# Patient Record
Sex: MALE | Race: BLACK OR AFRICAN AMERICAN | Employment: UNEMPLOYED | URBAN - NONMETROPOLITAN AREA
[De-identification: names, ages, dates, MRNs, and addresses within clinical notes are randomized per-mention and may not be internally consistent; named-entity substitution may affect disease eponyms.]

---

## 2021-07-02 ENCOUNTER — HOSPITAL ENCOUNTER (INPATIENT)
Age: 21
LOS: 7 days | Discharge: HOME | DRG: 885 | End: 2021-07-09
Attending: PSYCHIATRY & NEUROLOGY | Admitting: PSYCHIATRY & NEUROLOGY
Payer: COMMERCIAL

## 2021-07-02 PROBLEM — R45.851 SUICIDAL IDEATIONS: Status: ACTIVE | Noted: 2021-07-02

## 2021-07-02 PROBLEM — F32.9 MDD (MAJOR DEPRESSIVE DISORDER), SINGLE EPISODE: Status: ACTIVE | Noted: 2021-07-02

## 2021-07-02 LAB
CHOLEST SERPL-MCNC: 144 MG/DL
EST. AVERAGE GLUCOSE BLD GHB EST-MCNC: 108 MG/DL
HBA1C MFR BLD: 5.4 % (ref 4–5.6)
HDLC SERPL-MCNC: 59 MG/DL
HDLC SERPL: 2.4 {RATIO} (ref 0–5)
LDLC SERPL CALC-MCNC: 74.4 MG/DL (ref 0–100)
LIPID PROFILE,FLP: NORMAL
TRIGL SERPL-MCNC: 53 MG/DL (ref ?–150)
VLDLC SERPL CALC-MCNC: 10.6 MG/DL

## 2021-07-02 PROCEDURE — 83036 HEMOGLOBIN GLYCOSYLATED A1C: CPT

## 2021-07-02 PROCEDURE — 65220000003 HC RM SEMIPRIVATE PSYCH

## 2021-07-02 PROCEDURE — 36415 COLL VENOUS BLD VENIPUNCTURE: CPT

## 2021-07-02 PROCEDURE — 80061 LIPID PANEL: CPT

## 2021-07-02 RX ORDER — ACETAMINOPHEN 325 MG/1
650 TABLET ORAL
Status: DISCONTINUED | OUTPATIENT
Start: 2021-07-02 | End: 2021-07-09 | Stop reason: HOSPADM

## 2021-07-02 RX ORDER — OLANZAPINE 2.5 MG/1
5 TABLET ORAL
Status: DISCONTINUED | OUTPATIENT
Start: 2021-07-02 | End: 2021-07-09 | Stop reason: HOSPADM

## 2021-07-02 RX ORDER — HYDROXYZINE 25 MG/1
50 TABLET, FILM COATED ORAL
Status: DISCONTINUED | OUTPATIENT
Start: 2021-07-02 | End: 2021-07-09 | Stop reason: HOSPADM

## 2021-07-02 RX ORDER — HALOPERIDOL 5 MG/ML
5 INJECTION INTRAMUSCULAR
Status: DISCONTINUED | OUTPATIENT
Start: 2021-07-02 | End: 2021-07-09 | Stop reason: HOSPADM

## 2021-07-02 RX ORDER — LORAZEPAM 2 MG/ML
1 INJECTION INTRAMUSCULAR
Status: DISCONTINUED | OUTPATIENT
Start: 2021-07-02 | End: 2021-07-09 | Stop reason: HOSPADM

## 2021-07-02 RX ORDER — TRAZODONE HYDROCHLORIDE 50 MG/1
50 TABLET ORAL
Status: DISCONTINUED | OUTPATIENT
Start: 2021-07-02 | End: 2021-07-09 | Stop reason: HOSPADM

## 2021-07-02 RX ORDER — BENZTROPINE MESYLATE 1 MG/1
1 TABLET ORAL
Status: DISCONTINUED | OUTPATIENT
Start: 2021-07-02 | End: 2021-07-09 | Stop reason: HOSPADM

## 2021-07-02 RX ORDER — DIPHENHYDRAMINE HYDROCHLORIDE 50 MG/ML
50 INJECTION, SOLUTION INTRAMUSCULAR; INTRAVENOUS
Status: DISCONTINUED | OUTPATIENT
Start: 2021-07-02 | End: 2021-07-09 | Stop reason: HOSPADM

## 2021-07-02 RX ORDER — ADHESIVE BANDAGE
30 BANDAGE TOPICAL DAILY PRN
Status: DISCONTINUED | OUTPATIENT
Start: 2021-07-02 | End: 2021-07-09 | Stop reason: HOSPADM

## 2021-07-02 NOTE — CONSULTS
Chief Complaints:   Crying       Subjective:     Erin Ling is a 23 y.o. male followed by Unknown, Provider, MD and presents from [facility] after patient sister was concerned about his increased crying depressed mood. Patient not forthcoming with any history on my evaluation and is states that he is fine everything is secondary to stress. Because of patient's initial presentation he was referred for admission to behavioral health unit     No past medical history on file. No past surgical history on file. No family history on file. Social History     Tobacco Use    Smoking status: Current Every Day Smoker     Packs/day: 1.50     Types: Cigarettes   Substance Use Topics    Alcohol use: Not on file       Prior to Admission medications    Not on File     No Known Allergies     Review of Systems:  Review of Systems   Constitutional: Negative for chills, diaphoresis, fatigue and fever. HENT: Negative for congestion, ear pain, postnasal drip, sinus pain and sore throat. Eyes: Negative for pain, discharge and redness. Respiratory: Negative for cough, shortness of breath and wheezing. Cardiovascular: Negative for chest pain and palpitations. Gastrointestinal: Negative for abdominal pain, constipation, diarrhea, nausea and vomiting. Genitourinary: Negative for dysuria, flank pain, frequency and urgency. Musculoskeletal: Negative for arthralgias and myalgias. Neurological: Negative for dizziness, weakness and headaches. Psychiatric/Behavioral: Negative for agitation and hallucinations. The patient is not nervous/anxious. Objective:     Vitals:  Visit Vitals  /76   Pulse 62   Temp 97.8 °F (36.6 °C)   Resp 17   Ht 6' 1\" (1.854 m)   Wt 67.6 kg (149 lb)   SpO2 96%   BMI 19.66 kg/m²       Physical Exam:  General: Alert, cooperative, no distress. Head:  Normocephalic, without obvious abnormality, atraumatic. Eyes:  Conjunctivae/corneas clear.  Pupils equal, round, reactive to light. Extraocular movements intact. Lungs:  Clear to auscultation bilaterally, no wheezes, crackles  Chest wall: No tenderness or deformity. Heart:  Regular rate and rhythm, S1, S2 normal, no murmur, click, rub, or gallop. Abdomen:   Soft, non-tender. Bowel sounds normal. No masses. No organomegaly. Back:  No spine tenderness to palpation  Extremities: Extremities normal, atraumatic, no cyanosis or edema. Pulses: Symmetric all extremities. Skin: Skin color, texture, turgor normal.   Lymph nodes: Cervical nodes normal.  Neurologic: CNII-XII intact. Normal strength, sensation, and reflexes throughout. Labs:  No results found for this or any previous visit (from the past 24 hour(s)). Imaging:  No results found. Assessment & Plan:     Depression:  -Denies any diagnosis of but patient presents with being very tearful and denies any suicidal ideations or attempt  -Management as per primary psychiatric team    Tobacco use  -Smokes at times about a 1-1/2 pack/day and declined nicotine patch at this time    Thank you for allowing us to assist you with care of this patient please call with any questions or concerns    Spent 30 minutes evaluting and coordinating patient care consult of which >50% was spent coordinating and counseling.          Electronically signed by Naomi Hasnen MD on 7/2/2021 at 11:12 AM

## 2021-07-02 NOTE — BH NOTES
ADMISSION NOTE:    Pt is a 22 y/o/ [place] male who came in on a TDO at 36 from [email or web address] after family called concerning his mental health. Pt was reported to have ran into the wood with a box knife in his pocket and called his family telling him he loved them. He was reported to have been found sitting by a tree in the woods crying and later making statements such as, \"I should've went through with it\" and \"I just want to be in the ground next to my father. \"  Pt was reported to have been more depressed lately, lost his step father in February of this year, is having some relationship problems with his girlfriend whom he is expecting a baby with. Pt has NKDA. Pt was positive for THC. Pt reports he smokes 1 1/2 pack cigarettes a day but refused smoking patch. Covid negative. Pt denies taking any medications and stated \"do I have to take medication? \"   Pt advised that it would be his choice and he could discuss that with the doctor but we did discuss the option of an anti-depressant until things in his life settle down. Pt said he lives with his mother and sisters and his step father was the only real father he had and that they were very close and he did everything with him. Pt states that it was sudden and one day he just didn't wake up. Pt did not want to speak about it in detail and that was all he said. He was very tearful through the whole interview. Pt denied that he was currently suicidal or homicidal and said \"I wouldn't hurt anyone else or myself\" and said when they found him he was just sitting by the tree smoking his cigarette. Pt did not want to address statements that were made and didn't want to talk about it. Pt stated he told them he was just tired and wanted to go home. Pt did say he needed to have someone call the [facility] today to inform him where he was as he was to show up there today to serve 9 days in detention and he didn't want them looking for him.   Pt stated he talked to his PO and only said that he was serving time d/t having a hard time making all his appointments due to transportation issues and work. Pt did not want to say why he had charges or why he had a PO. Pt reports no hx of psychiatric care up to this point. He has no medical history. Pt did admit to being depressed, was tearful and anxious about being here but did not rate it. Pt denied SI/HI or AVH. Pt denied pain. Pt was a bit guarded and irritable at times for having to be here. Pt said he was not explained his rights or the process in how he arrived here, so this writer filled him in. Pt still feels he does not need to be here and denies he would not contract for safety at that time even though this was reported as to why he was being placed on TDO. Pt said he wanted to just go back home with his family. Pt skin check completed; no open areas or scars noted. He does have two tattoos to his R&LFA. Pt has both ears pierced and his earrings were locked in bag for security. Pt arrived handcuffed w/officer in wheelchair and security. He only had on hospital scrubs, underwear and socks with no other belongings. Pt changed into brown scrubs, given unit rules, tour and given phone call. Pt has been resting in bed since. Pt is on 15 min safety checks. Will continue to monitor and treat. Pt admitted to room 104-1 under TDO for MDD, single episode and suicidal ideations w/standard  orders, regular diet per Dr. Lisa Dudley.

## 2021-07-02 NOTE — BH NOTES
Patient has remained in bed today with exception of meal times. [facility] would not report today to begin mandatory nine day group home sentence. Denies any SI/HI, anxiety or depression. When asked if prescribed medication would he take it he stated \"No I do not want no medication\".

## 2021-07-03 LAB
CHOLEST SERPL-MCNC: 137 MG/DL
HDLC SERPL-MCNC: 53 MG/DL
HDLC SERPL: 2.6 {RATIO} (ref 0–5)
LDLC SERPL CALC-MCNC: 71.4 MG/DL (ref 0–100)
LIPID PROFILE,FLP: NORMAL
TRIGL SERPL-MCNC: 63 MG/DL (ref ?–150)
VLDLC SERPL CALC-MCNC: 12.6 MG/DL

## 2021-07-03 PROCEDURE — 65220000003 HC RM SEMIPRIVATE PSYCH

## 2021-07-03 PROCEDURE — 36415 COLL VENOUS BLD VENIPUNCTURE: CPT

## 2021-07-03 PROCEDURE — 80061 LIPID PANEL: CPT

## 2021-07-03 RX ORDER — CITALOPRAM 20 MG/1
20 TABLET, FILM COATED ORAL
Status: DISCONTINUED | OUTPATIENT
Start: 2021-07-04 | End: 2021-07-09 | Stop reason: HOSPADM

## 2021-07-03 NOTE — BH NOTES
B: Pt is A&Ox3. He has been in and out of his room tonight, mostly around group time, stayed out and had evening snack. Pt has a flat affect, and denies everything currently and was mainly asking about his court date with the  here and also asking about serving his time here rather than in nursing home. I advised that I had no idea how that worked and he would have to discuss that with the  next week. Pt denies everything tonight including anxiety/depression. Pt denies pain. Pt continues on 15 min safety checks.       I:  Administer medications as ordered and needed. Encourage pt to attend and participate in groups, encourage pt to be up for all meals and snacks, consuming all each time, encourage pt to interact with peers in a positive manner. Q 15 minute safety checks continue.     R:  Pt has no scheduled medication at this time. Pt had no prn's and has been resting well. Pt has been out for meals but said he ate some of it because he didn't like a lot of what he had. Pt said he did fill out menu for tomorrow so he will eat more then. Pt reports he did not attend any groups today but did attend wrap up tonight. Pt had evening snack. Pt was out in [place] but kept more to himself. Pt continues on 15 min safety checks.       P:  Pt will be medication compliant.  Pt will be encouraged to attend all groups and participate.   Pt will have appropriate behaviors with staff and peers.  Pt will have no more suicidal thoughts. Will continue with POC.

## 2021-07-03 NOTE — BH NOTES
Pt. Alert and oriented x 3. Pt. States depression is a 0. Pt. States anxiety is 0. Pt. denies hallucinations. Denies SI/HI. Complies with assessment. I: Administer medications as ordered and needed, Encourage pt to attend and participate in groups, encourage pt to be up for all meals and snacks, consuming all each time, encourage pt to interact with peers in a positive manner. Q 15 minute safety checks continue. R: Complies with medications. does attend groups, does participate. Pt. is getting up for meals, consumes 75% of meals, snacks. Pt. does interact with peers. No safety concerns at this time. P: Patient will continue to participate in group  activity and interact with patients and staff in a positive manner.

## 2021-07-03 NOTE — BH NOTES
Pt. Alert and oriented x 3. Physical Assessment was done et wnl. No c/o pain voiced. Pt. Calm et cooperative. Pt. States depression is a 0/10. Pt. States anxiety is 0/10. Pt. Denies  Hallucinations. Pt. Denies SI/HI. I: Administer medications as ordered and needed, Encourage pt to attend and participate in groups, encourage pt to be up for all meals and snacks, consuming all each time, encourage pt to interact with peers in a positive manner. Q 15 minute safety checks continue. R: Pt. Is compliant with medications. does attend groups, does participate. Pt. is getting up for meals, consumes 100% of meals, snacks. Pt. does interact with peers. no safety concerns at this time. P:Will cont. To monitor q15min. Checks for safety. Administer medications as ordered et as needed. Will cont. To encourage group attendance et participation. Will monitor pt. For any signs of outbursts.

## 2021-07-03 NOTE — BH NOTES
Patient has been out of the room talking with other patients and in the dining room watching tv. No safety concerns at this time.

## 2021-07-03 NOTE — H&P
[facility]  PSYCH HISTORY AND PHYSICAL    Name:  Dina Farr  MR#:  [id number]  :  2000  ACCOUNT #:  [de-identified]  ADMIT DATE:  2021      This is a 25-year-old single  male patient admitted to behavioral health unit under TDO, [facility].    CHIEF COMPLAINT:  He was in the woods, smoking, wanted to cut himself with a . HISTORY OF PRESENT ILLNESS:  Apparently, the family was concerned, called the police that the patient was in the woods with a , crying, threatening to kill himself, want to die, buried next to his stepfather. The patient currently is not getting any psychiatric help. The patient tells me that he works and he makes money and he is supposed to give some money to some ,  or go to prison and serve time. He was trying to get, he could not get any help. Cousins or anybody, he was way behind. He was crying, upset, depressed, he is still vague about it. Apparently, his stepfather . He was close to him, he misses him. Also has issues with his girlfriend. The patient currently ***. He was profusely crying as per family and wants to die. They called [facility] and he was not cooperative and crying uncontrollably. They basically TDO him. PAST HISTORY:  No prior psychiatric treatment. TRAUMA HISTORY:  Denied it. FAMILY HISTORY:  Denied it. SUBSTANCE ABUSE HISTORY:  He smokes one and half-pack of cigarettes. He refused the patch and does smoke marijuana. Alcohol occasionally. MEDICAL HISTORY:  Unremarkable. ALLERGIES TO MEDICATIONS:  NO KNOWN ALLERGIES TO MEDICATION. CURRENT MEDICATIONS:  None. MENTAL STATUS:  Tall, thin-built male patient, alert, verbal, polite, cooperative, flat affect, poor eye contact, distant, not crying, but acknowledged of depression, having the , but he says he is not suicidal anymore.   He still was not sure why he was, except some issues about, may be not making in time to [facility] or pay the fine or missing his stepfather, who he is close to, or girlfriend issue. Denies suicidal thoughts. He felt he do not need to be here. His insight is poor. Judgment is poor. He is minimizing the whole thing. No hallucination, no delusions, no paranoia, no psychosis. He has an approved admission by Dr. Souleymane John. DIAGNOSES:  Major depression, single episode, acute severe with suicidal ideation. The patient needs an inpatient level of care as he is suicidal, but also he is under Temporary snf Order and placed him on an antidepressant medication, some p.r.n. Tylenol, Mylanta, trazodone, and Vistaril. TRAUMA HISTORY:  Denied it. LABORATORY DATA:  I do not see any lab work from [facility]. I did found later. His urinalysis; leukocyte trace, ketones positive, WBC 9, and COVID not detected. Drug screen positive for marijuana and CBC unremarkable. LENGTH OF STAY:  Five to seven days. CRITERIA FOR DISCHARGE:  Free of suicidal thought, learning coping skills, stress management, establish a support system, stable neurovegetative function, mood stability. STRENGTH:  Family support, physically healthy, has a job. LIABILITIES:  Untreated depression.       Safia Acuña MD      [id number]  D:  07/03/2021 12:44  T:  07/03/2021 18:36  JOB #:  [id number]

## 2021-07-03 NOTE — BH NOTES
Pt. Has been visible on the unit today, ambulating in the halls. Pt. Ate meals in the dining room. Pt. Attended et participated in group meetings. Pt. Interacted well with staff et peers. No signs of outbursts noted. Will cont. To monitor q15min. Checks for safety.

## 2021-07-04 PROCEDURE — 65220000003 HC RM SEMIPRIVATE PSYCH

## 2021-07-04 NOTE — H&P
[facility]  PSYCH HISTORY AND PHYSICAL    Name:  Cortez Holt  MR#:  [id number]  :  2000  ACCOUNT #:  [de-identified]  ADMIT DATE:  2021      This is a 28-year-old single  male patient admitted to behavioral health unit under TDO, [facility].    CHIEF COMPLAINT:  He was in the woods, smoking, wanted to cut himself with a . HISTORY OF PRESENT ILLNESS:  Apparently, the family was concerned, called the police that the patient was in the woods with a , crying, threatening to kill himself, want to die, buried next to his stepfather. The patient currently is not getting any psychiatric help. The patient tells me that he works and he makes money and he is supposed to give some money to some ,  or go to half-way and serve time. He was trying to get, he could not get any help. Cousins or anybody, he was way behind. He was crying, upset, depressed, he is still vague about it. Apparently, his stepfather . He was close to him, he misses him. Also has issues with his girlfriend. The patient currentl. He was profusely crying as per family and wants to die. They called [facility] and he was not cooperative and crying uncontrollably. They basically TDO him. PAST HISTORY:  No prior psychiatric treatment. TRAUMA HISTORY:  Denied it. FAMILY HISTORY:  Denied it. SUBSTANCE ABUSE HISTORY:  He smokes one and half-pack of cigarettes. He refused the patch and does smoke marijuana. Alcohol occasionally. MEDICAL HISTORY:  Unremarkable. ALLERGIES TO MEDICATIONS:  NO KNOWN ALLERGIES TO MEDICATION. CURRENT MEDICATIONS:  None. MENTAL STATUS:  Tall, thin-built male patient, alert, verbal, polite, cooperative, flat affect, poor eye contact, distant, not crying, but acknowledged of depression, having the , but he says he is not suicidal anymore.   He still was not sure why he was, except some issues about, may be not making in time to penitentiary or pay the fine or missing his stepfather, who he is close to, or girlfriend issue. Denies suicidal thoughts. He felt he do not need to be here. His insight is poor. Judgment is poor. He is minimizing the whole thing. No hallucination, no delusions, no paranoia, no psychosis. He has an approved admission by Dr. Cristina Muñiz. DIAGNOSES:  Major depression, single episode, acute severe with suicidal ideation. The patient needs an inpatient level of care as he is suicidal, but also he is under Temporary jail Order and placed him on an antidepressant medication, some p.r.n. Tylenol, Mylanta, trazodone, and Vistaril. TRAUMA HISTORY:  Denied it. LABORATORY DATA:  I do not see any lab work from [address] [place] [address]. I did found later. His urinalysis; leukocyte trace, ketones positive, WBC 9, and COVID not detected. Drug screen positive for marijuana and CBC unremarkable. LENGTH OF STAY:  Five to seven days. CRITERIA FOR DISCHARGE:  Free of suicidal thought, learning coping skills, stress management, establish a support system, stable neurovegetative function, mood stability. STRENGTH:  Family support, physically healthy, has a job. LIABILITIES:  Untreated depression.       Jae Hannah MD      [id number]  D:  07/03/2021 12:44  T:  07/03/2021 18:36  JOB #:  [id number]

## 2021-07-04 NOTE — BH NOTES
Pt. Alert and oriented x 4. Pt. States depression is a 0. Pt. States anxiety is 0. Pt. Denies hallucinations. Denies SI/HI. Cooperative with assessment. Pt. States he just said something out of anger and went to cope in the woods like he always does, happen to have a  on him from work that day and everything kind of snowballed from there. He states he was not suicidal, pt. Feels he is not even depressed. Writer spoke with patient in great lengths regarding depression, pt. . states he would talk to the doctor regarding his medication. I: Administer medications as ordered and needed, Encourage pt to attend and participate in groups, encourage pt to be up for all meals and snacks, consuming all each time, encourage pt to interact with peers in a positive manner. Q 15 minute safety checks continue. R: Non compiant with medications, until speaking with physician. does attend groups, does not participate. Pt. is getting up for meals, consumes all of meals, snacks. Pt. does interact with peers. no safety concerns at this time. P:  Pt. Will develop and utilize positive coping skills.

## 2021-07-04 NOTE — BH NOTES
Pt received begining of of shift at phone,    Pt attended wrap up group and ate snack, socializing with others and watching movie play cards and talking with them, no behavioral issues,        denies SI/HI ioana A/V hallucinations, cooperative with assessment . His Father as reported by ED staffa nd supervisor called multiple time trying to locate pt, father phone given to pt at 2130  And pt given  the option to call him to assure him, but pt refused asking to keep his number in nursing station,  Again at 12 father called unit, placed on hold, pt encouraged to talk to him to assure him and to in form him about phone times, pt was hesitant to talk but accepted and talked to his father for a minute. no in formation relased to father at all. about p[t.       No secheduled medication HS time prescribed.        Pt socialized  with other till almost 0015 then   Start sleeping by 0045 , remained sleeping as of this time.     and safety checks Q 15 minutes maintained

## 2021-07-04 NOTE — BH NOTES
Patient has been calm and cooperative. Interacting with patients and staff walking up and down the  and smiling.

## 2021-07-04 NOTE — PROGRESS NOTES
Problem: Suicide  Goal: *STG: Remains safe in hospital  Outcome: Progressing Towards Goal  Goal: Interventions  Outcome: Progressing Towards Goal

## 2021-07-05 PROCEDURE — 65220000003 HC RM SEMIPRIVATE PSYCH

## 2021-07-05 NOTE — BH NOTES
Patient seen for follow-up chart review spoke with the nursing staff patient is out of his room interacting with the peers playing cards attending the groups affect are flat but says he is trying to get better is his primary reason he was upset want to harm himself because he had to pay certain funds and be at a certain place was not able to do the heart regular try it was hard. He was afraid it may affect him negatively from staff.   He rated his level officer note that he is in the hospital says not suicidal he does feel inadequate he says he do not have a car he wanted to work on getting a license to drive

## 2021-07-05 NOTE — BH NOTES
Pt received begining of of shift at phone,     Pt attended wrap up group  Didn't fell group paper. and ate snack, socializing with others and watching movie play cards and talking with them, no behavioral issues,          denies SI/HI ioana A/V hallucinations, cooperative with assessment .     No secheduled medication HS time prescribed.         Pt socialized  with other by watching Tv, playing cards and talking.     Start sleeping [id number] , remained sleeping as of this time.     and safety checks Q 15 minutes maintained

## 2021-07-05 NOTE — BH NOTES
B: Pt is alert and oriented x4. Pt is cooperative with assessments. Pt reports feeling \"good\". Pt states they are here because \" in my back pocket\". With encouragement, Pt continued that the  observed a  in his back pocket. States he was agitated about missing the payment time to reduce his custodial-time, and things got out of hand. Pt identifies they're doing same since their admission. Pt is able to identify personal coping alternatives. No s/s of distress noted. No complaints of pain. I: Assess Pt mood. Document presence of depressive/anxiety symptoms. Assess for Audio/visual hallucinations. Establish trust.  Educate Pt on medications and disease processes. Monitor ADLs, food/fluid consumption and sleep. Encouarge group participation and socialization. Educate Pt on medications, coping alternatives, disease processes, and community resources. Safety checks. R: Pt mood is stable: calm, social. Pt rates depression 0/10, identifies triggers as none. Pt rates anxiety at 0/10, identifies triggers as none. Pt denies SI. Pt denies HI. Pt denies audio/visual hallucinations, s/s are not observed by staff. Pt denies mental health concerns. Pt is attending groups and is interacting appropriately with staff/peers. Pt is eating all meals, and is maintaining their personal hygiene. Pt reports sleeping well. Pt is compliant wiith medications. P:  Encourage group participation and socialization. Assist in coping alternatives to anger.

## 2021-07-05 NOTE — PROGRESS NOTES
Problem: Suicide  Goal: *STG: Remains safe in hospital  Outcome: Progressing Towards Goal  Goal: *STG: Attends activities and groups  Outcome: Progressing Towards Goal  Goal: *STG/LTG: Complies with medication therapy  Outcome: Not Progressing Towards Goal  Goal: Interventions  Outcome: Progressing Towards Goal

## 2021-07-06 PROCEDURE — 65220000003 HC RM SEMIPRIVATE PSYCH

## 2021-07-06 PROCEDURE — 74011250637 HC RX REV CODE- 250/637: Performed by: PSYCHIATRY & NEUROLOGY

## 2021-07-06 RX ADMIN — CITALOPRAM HYDROBROMIDE 20 MG: 20 TABLET ORAL at 12:27

## 2021-07-06 NOTE — BH NOTES
B: Pt is awake and alert, pleasant and cooperative with staff and peers. Denies suicidal and homicidal thoughts. Says depression and anxiety is better. Eating and sleeping well, compliant with meds. Safe on unit. I: Maintain a safe environment for pt, safety cks q 15 mins and prn. Encourage groups and give meds as ordered. R: Pt is pleasant and cooperative with staff and peers. States he feels much better. Attending and participating in groups. Safe on unit. P: Continue to monitor, encourage groups, give meds as ordered.

## 2021-07-06 NOTE — BH NOTES
Pt has been out and about on unit today. Attended and participated in groups. Pleasant and cooperative with staff and peers. Denies suicidal and homicidal thought. Safe on unit will continue to monitor.

## 2021-07-06 NOTE — BH NOTES
Patient seen for follow-up patient is out of his room in the day room with peers his activities lunch and good energy good motivation affect flat but interacting with the peers says feeling better not suicidal did not take any Celexa which further discuss no want to take any medication want to do without it patient was not suicidal not homicidal not impulsive no auditory visual hallucination noted keeping in touch with the family and courses like to have a hearing tomorrow her vital signs today temperature 97.8 pulse 61 blood pressure 93/58 respiration 18 SPO2 98 room air labs reviewed no new labs or his new labs resulted continued inpatient level of care indicated she is going to have a hearing tomorrow

## 2021-07-06 NOTE — BH NOTES
B: Pt is A&Ox4. Pt has been doing well. He has been out on the unit, socializing with peers and staff and is appropriate. Pt states he feels much better is is mostly a little anxious about court tomorrow, unsure of the process and if he will get to go home. He said he had a nice conversation with Nirav. He did say he was asking once again about possibly serving his time here rather than [facility]. Pt has maintained all along that he had no intention of harming himself and at no time did he take the  from his pocket. He does agree he was upset that night but was not intending to kill himself. Pt denies SI/HI and AVH. He denies depression but does have some anxiety over court but does not rate. Pt denies pain. Pt continues on 15 min safety checks.       I:  Administer medications as ordered and needed. Encourage pt to attend and participate in groups, encourage pt to be up for all meals and snacks, consuming all each time, encourage pt to interact with peers in a positive manner. Q 15 minute safety checks continue.     R:  Pt was started on Celexa on the 4th but has since refused it. Pt feels he does not need any medication at this time. Pt had no prn's and says he has been resting well. Pt has been out for all meals and has a good appetite. Pt has been out for groups and has been participating. Pt has had no behaviors and his mood is stable, he remains calm and cooperative. Pt continues on 15 min safety checks.       P:  Pt will be medication compliant.  Pt will be encouraged to attend all groups and participate.   Pt will have appropriate behaviors with staff and peers.  Pt will have no more suicidal thoughts. Will continue with POC.

## 2021-07-06 NOTE — PROGRESS NOTES
Problem: Depressed Mood (Adult/Pediatric)  Goal: *STG: Remains safe in hospital  Outcome: Progressing Towards Goal  Goal: *LTG: Returns to previous level of functioning and participates with after care plan  Outcome: Progressing Towards Goal  Goal: *LTG: Understands illness and can identify signs of relapse  Outcome: Progressing Towards Goal

## 2021-07-06 NOTE — PROGRESS NOTES
Pt is progressing toward his goals. Denies suicidal and homicidal thoughts. Compliant with meds, attending and participating in groups. Safe on unit.

## 2021-07-07 PROCEDURE — 74011250637 HC RX REV CODE- 250/637: Performed by: PSYCHIATRY & NEUROLOGY

## 2021-07-07 PROCEDURE — 65220000003 HC RM SEMIPRIVATE PSYCH

## 2021-07-07 RX ADMIN — CITALOPRAM HYDROBROMIDE 20 MG: 20 TABLET ORAL at 08:48

## 2021-07-07 NOTE — BH NOTES
PSYCHOSOCIAL ASSESSMENT  :Patient identifying info:   Cameron Garrett is a 23 y.o., male admitted 7/2/2021 12:47 AM     Presenting problem and precipitating factors: Pt is TDO allowed to be voluntary. Pt reports he has been depressed due to his situation recently. Pt reports he has legal problems and is facing a short stay in residential due to missing meetings with his . Pt reports he wanted to be by himself and was sitting by the woods. Pt reports his family believed he was suicidal because he had a boxcutter from work. Mental status assessment: Pt is AO x 4, denies AVH, denies SI/HI, cooperative. Pt denies anxiety and depression. Pt has a broad affect and pleasant presentation. Strengths:  Support system, verbal, cooperative    Collateral information: [facility] [phone]: Prescreen notes pt brought in on paperless [employer] after family  Called concerned with mental status. Reports client ran into [employer] with a box knife and called his family to tell them that he loved them. Pt was sobbing when WILFRIDO arrived and had a . Pt was cooperative with WILFRIDO, but during assessment was not cooperative due to unstable mood and sobbing. Pt refused to talk with anyone for assessment and stated, \"I should've gone through  With it. \"  And \"I am tired of life. \"  \"I just want to be in the ground next to my father. \"      Current psychiatric /substance abuse providers and contact info: None    Previous psychiatric/substance abuse providers and response to treatment:   None    Family history of mental illness or substance abuse: None    Substance abuse history:  Marijuana use 2 days per week, 1-2 blunts     Social History     Tobacco Use    Smoking status: Current Every Day Smoker     Packs/day: 1.50     Types: Cigarettes   Substance Use Topics    Alcohol use: Not on file       History of biomedical complications associated with substance abuse :  None    Patient's current acceptance of treatment or motivation for change:  Accepting, cooperative    Family constellation: Raised by his mother and stepfather (since age 10). Stepfather  last year. Pt has 1 sister. He reports his father is not in his life. Is significant other involved? None      Describe support system: Mom, sister    Describe living arrangements and home environment:  He lives in a trailer with his mother.      Health issues:   Hospital Problems  Never Reviewed        Codes Class Noted POA    Suicidal ideations ICD-10-CM: R45.851  ICD-9-CM: V62.84  2021 Unknown        MDD (major depressive disorder), single episode ICD-10-CM: F32.9  ICD-9-CM: 296.20  2021 Unknown              Trauma history: None     Legal issues: Breaking and entry - currently on probation    History of  service: None    Financial status: Employed    Anglican/cultural factors:  None     Education/work history: High School    Have you been licensed as a health care professional (current or ):   None  Leisure and recreation preferences:   Music, game on his phone  Describe coping skills: [address] [facility] SHAN Cobb, Supervisee in Social Work  2021    PSA PART II ADDITIONAL INFORMATION        Access To [employer]: No    Substance Use: YES    Last Use: 2021    Type of Substance: THC use    Frequency of Use: 2 days a week    Request to See : NO    If yes, notified : NO    Release of Information Signed: YES    Release of Information Signed For: Mother

## 2021-07-07 NOTE — PROGRESS NOTES
Pt is progressing toward goals. Deines suicidal and homicidal thoughts. States depression and anxiety is better. Complaint with med, attending groups, safe on unit.

## 2021-07-07 NOTE — BH NOTES
B: Pt is A&Ox4.  Pt appears to be doing well on the unit and is up talking to peers, watching movies and socializing up until bedtime. Pt was reported to also have had a good day. He apparently had a good conversation with his  and also his girlfriend and was reported to have been calm and appropriated during conversations. Pt has been visible on unit all day, had court this morning and went voluntary. Pt was reported to also have attended and participated in groups. Pt still refusing that he needed medication and refused his AM dose but I see he took it in the afternoon though. Pt continues on 15 min safety checks.       I:  Administer medications as ordered and needed. Encourage pt to attend and participate in groups, encourage pt to be up for all meals and snacks, consuming all each time, encourage pt to interact with peers in a positive manner. Q 15 minute safety checks continue.     R:  Pt was started on Celexa on the 4th and refused it this morning again but it was noted that he had taken it at 1227 per [facility]. Pt had no prn's tonight. Pt has been calm and cooperative and had no behaviors today. He has been up for meals today and has good intake reporting he eats everything. Pt did also have several snacks tonight. Pt was up with peers and watching movie tonight in dayroom. Pt did also attend and participate in wrap up group as well. He denies anxiety/depression. He also denies SI/HI and AVH at this time. Pt denies pain. Pt states he is resting well at night so far. Pt still has 9 days [facility] time to serve once he leaves here. He is still hoping to serve his time here.  Pt continues on 15 min safety checks.       P:  Pt will be medication compliant.  Pt will be encouraged to attend all groups and participate.   Pt will have appropriate behaviors with staff and peers.  Pt will have no more suicidal thoughts.  Will continue with POC.

## 2021-07-07 NOTE — BH NOTES
B: pt is awake and alert, deies suicidal and homicidal thoughts. States his depression ad anxiety is better. Complain with meds. Attending and participating in groups. . Pleasant and cooperative with staff and peers. Safe on unit. I: Maintain a safe environment for pt, safety cks q 15 mins and prn. Encourage groups and give meds as ordered. R: Pt is friendly and cooperative with staff. Denies suicidal and homicidal thoughts. Safe on unit will continue to monitor. P: Continue to monitor, encourage groups, give meds as ordered.

## 2021-07-07 NOTE — BH NOTES
Pt has had a good day, pleasant with staff and peers. Out and about on unit, participates in groups. Compliant with meds, sleeps and eats well. Safe on unit will continue to monitor.

## 2021-07-07 NOTE — PROGRESS NOTES
Problem: Depressed Mood (Adult/Pediatric)  Goal: *STG: Verbalizes anger, guilt, and other feelings in a constructive manor  Outcome: Progressing Towards Goal  Goal: *STG: Remains safe in hospital  Outcome: Progressing Towards Goal  Goal: *STG: Complies with medication therapy  Outcome: Progressing Towards Goal  Goal: *LTG: Understands illness and can identify signs of relapse  Outcome: Progressing Towards Goal

## 2021-07-07 NOTE — BH NOTES
Behavioral Health Treatment Team Note     Patient goal(s) for today: Participate in program  Treatment team focus/goals: Assess, engage in treatment, monitor medications    Progress note: Pt is a TDO now voluntarily admitted. Pt will be further assessed to determine psychosocial needs. Pt currently denies SI/HI, denies AVH, and is cooperative. Will further assess, engage in treatment, and monitor medications.      LOS:  5  Expected LOS: 7    Insurance info/prescription coverage:  TDO  Date of last family contact:  None  Family requesting physician contact today:  no  Discharge plan:  Discharge home with follow-up in [facility]  Guns in the home:  no   Outpatient provider(s):  Unknown

## 2021-07-07 NOTE — BH NOTES
Treatment Team Meeting held. Patient in hallway playing basketball. Very pleasant and cooperative. Smiling and states he feels he is ready to be discharged. Patient states that he has 9 days he has to pull in prison but hopes that his  can help him get his time decreased. Patient has been taking his Prozac po every day and states he is feeling some better. Patient will be discharged Thursday or Friday. Gets along well with peers and staff. States he has a good appetite  and is sleeping better at night. Patient denies hallucinations. He also denies any SI/HI ideations. Will continue to follow current plan of care.

## 2021-07-07 NOTE — PROGRESS NOTES
Discussed case interviewed patient  He is doing a little bit better and and is Prozac was started  He is downplaying his suicidal ideations and having had a   Getting along better with peers and staff  Some depressive symptoms  Eating and sleeping better  Participates in groups  Polite has a positive attitude towards others    Mental status exam  Alert oriented cooperative  Speech is goal-directed somewhat anxious  Thought process linear and logical  Insight and judgment fair  Does not appear to respond to internal stimuli    Recommendations  Continue inpatient treatments and assessments  Prozac was initiated  Follow-up with me in the team again tomorrow

## 2021-07-07 NOTE — PROGRESS NOTES
Mr. Cam Coles actively participated in Spirituality Group about self-control and hope on non-acute Behavioral Health unit. Chaplain Jose Mathias M.Div.    can be reached by calling the  at [facility]  [phone]

## 2021-07-08 PROCEDURE — 65220000003 HC RM SEMIPRIVATE PSYCH

## 2021-07-08 PROCEDURE — 74011250637 HC RX REV CODE- 250/637: Performed by: PSYCHIATRY & NEUROLOGY

## 2021-07-08 RX ADMIN — CITALOPRAM HYDROBROMIDE 20 MG: 20 TABLET ORAL at 08:25

## 2021-07-08 NOTE — BH NOTES
Pt. Alert and oriented x 3. Physical Assessment was done et wnl. No c/o pain voiced. Pt. Calm et cooperative. Pt. States depression is a 0/10. Pt. States anxiety is 0/10. Pt. Denies having hallucinations. Pt. Denies SI/HI. I: Administer medications as ordered and needed, Encourage pt to attend and participate in groups, encourage pt to be up for all meals and snacks, consuming all each time, encourage pt to interact with peers in a positive manner. Q 15 minute safety checks continue. R: Pt. iis compliant with medications. does attend groups, does participate. Pt. is getting up for meals, consumes 100% of meals, snacks. Pt. does interact with peers. no safety concerns at this time. P:Will cont. To monitor q15min checks for safety. Administer medications as ordered et as needed. Willl cont. To encourage group attendance et participation.

## 2021-07-08 NOTE — BH NOTES
B: Pt is A&Ox4.  Pt was reported to have had a good day. He was out on the unit playing ball, talking to peers and staff, met with treatment team and is hoping for discharge on Thursday/Friday. Pt still has to serve his senior care time on discharge but is hoping it will get reduced. Pt continues on 15 min safety checks.       I:  Administer medications as ordered and needed. Encourage pt to attend and participate in groups, encourage pt to be up for all meals and snacks, consuming all each time, encourage pt to interact with peers in a positive manner. Q 15 minute safety checks continue.     R:  Pt was started on Celexa on the 4th but finally started taking it other day after he had court. Pt also took it this morning. Pt had no prn's and has been resting well without medication. He was on the phone today with his girlfriend and conversation was appropriate. Pt has has no behaviors and has been calm and cooperative. He is out for groups and participating and also watching movies with peers and definitely smiling and socializing more. He is up until bedtime interacting. Pt has also been up for all meals/snacks and has good intake. He denied anxiety and depression. He also denied SI/HI and AVH. Pt denied pain. Pt looking forward to being discharged. Pt reports knowing he has made some bad choices but wants to get his time done and move on with his life and is looking forward to the birth of his child. Pt continues on 15 min safety checks.       P:  Pt will be medication compliant.  Pt will be encouraged to attend all groups and participate.   Pt will have appropriate behaviors with staff and peers.  Pt will have no more suicidal thoughts.  Will continue with POC.

## 2021-07-08 NOTE — BH NOTES
Pt. Has been visible on the unit, ambulating in the halls. Pt. Ate meals in the dining room. Pt. Attended et participated in group meetings. Pt. Interacted well with staff et peers. No signs of any outbursts noted. Will cont. To monitor q15min. Checks for safety.

## 2021-07-08 NOTE — PROGRESS NOTES
Problem: Depressed Mood (Adult/Pediatric)  Goal: *LTG: Returns to previous level of functioning and participates with after care plan  Outcome: Progressing Towards Goal  Goal: *LTG: Understands illness and can identify signs of relapse  Outcome: Progressing Towards Goal

## 2021-07-09 VITALS
SYSTOLIC BLOOD PRESSURE: 130 MMHG | DIASTOLIC BLOOD PRESSURE: 82 MMHG | HEIGHT: 73 IN | TEMPERATURE: 98.4 F | HEART RATE: 67 BPM | WEIGHT: 149 LBS | OXYGEN SATURATION: 98 % | BODY MASS INDEX: 19.75 KG/M2 | RESPIRATION RATE: 16 BRPM

## 2021-07-09 PROCEDURE — 74011250637 HC RX REV CODE- 250/637: Performed by: PSYCHIATRY & NEUROLOGY

## 2021-07-09 RX ORDER — CITALOPRAM 20 MG/1
20 TABLET, FILM COATED ORAL
Qty: 30 TABLET | Refills: 1 | Status: SHIPPED | OUTPATIENT
Start: 2021-07-10 | End: 2021-08-09

## 2021-07-09 RX ADMIN — CITALOPRAM HYDROBROMIDE 20 MG: 20 TABLET ORAL at 08:36

## 2021-07-09 NOTE — PROGRESS NOTES
Problem: Suicide  Goal: *STG: Remains safe in hospital  Outcome: Progressing Towards Goal  Goal: *STG: Attends activities and groups  Outcome: Progressing Towards Goal  Goal: *STG/LTG: Complies with medication therapy  Outcome: Progressing Towards Goal  Goal: *STG/LTG: No longer expresses self destructive or suicidal thoughts  Outcome: Progressing Towards Goal  Goal: Interventions  Outcome: Progressing Towards Goal

## 2021-07-09 NOTE — BH NOTES
Patient denies any SI/HI anxiety or depression. Discharge paperwork reviewed and signed, belongings returned.

## 2021-07-09 NOTE — GROUP NOTE
SEVERIANO  GROUP DOCUMENTATION INDIVIDUAL                                                                          Group Therapy Note    Date: 7/8/2021    Group Start Time: 1400  Group End Time: 1500  Group Topic: Education Group - Inpatient    [facility]    Group Therapy Note    Group members participated in psychoeducational group on grounding skills. Grounding was defined, thinking and emotions were discussed, and grounding skills were taught. Group members were given worksheets and experientially participated in learning experience. Attendance: Attended    Patient's Goal:  Attendance    Interventions/techniques: Informed, Validated and Supported    Follows Directions:  Followed directions    Interactions: Interacted appropriately    Mental Status: Calm and Congruent    Behavior/appearance: Attentive and Cooperative    Goals Achieved: Able to engage in interactions, Able to listen to others, Identified feelings and Identified triggers      SHAN Nava, Supervisee in Social Work

## 2021-07-09 NOTE — BH NOTES
DISCHARGE SUMMARY    Rachael Fernandez  : 2000  MRN: [id number]    The patient Pearline Blizzard exhibits the ability to control behavior in a less restrictive environment. Patient's level of functioning is improving. No assaultive/destructive behavior has been observed for the past 24 hours. No suicidal/homicidal threat or behavior has been observed for the past 24 hours. There is no evidence of serious medication side effects. Patient has not been in physical or protective restraints for at least the past 24 hours. If weapons involved, how are they secured? Pt had a  from work. He no longer has it available at this time. Is patient aware of and in agreement with discharge plan? Yes    Arrangements for medication:  Prescriptions given to patient, given a weeks supply or 30 day supply. Copy of discharge instructions to provider?:  Yes    Arrangements for transportation home:  Mother    Keep all follow up appointments as scheduled, continue to take prescribed medications per physician instructions. Mental health crisis number:  413 or your local mental health crisis line number at [phone].       Mental Health Emergency WARM LINE      4-307-680-MH ([phone])      M-F: 9am to 9pm      Sat & Sun: 5pm  9pm  National suicide prevention lines:                             3-070-GQTHASG ([phone])       [phone] ([phone])    Crisis Text Line:  Text HOME to 475922

## 2021-07-09 NOTE — BH NOTES
SW contacted pt's mother about his care and discharge. Pt's mother, Reyes Campbell, reports she spoke with him and he seems to be doing well. She denied having questions at this time. She verified he will have a place to come home to.

## 2021-07-09 NOTE — BH NOTES
Pt received begining in [facility].     Pt attended wrap up group .and ate snack, socializing with others and watching TV,play cards and talking with them, no behavioral issues,          denies SI/HI ioana A/V hallucinations, cooperative with assessment .     No secheduled medication HS time prescribed.             Start sleeping by 2223 , remained sleeping as of this time.     and safety checks Q 15 minutes maintained

## 2021-07-09 NOTE — BH NOTES
Pt. Alert and oriented x 4. Pt. States depression is a 0. Pt. States anxiety is 0. Pt. Denies hallucinations. Denies SI/HI. Cooperative with assessment. Pt. Feels he is ready to go home. I: Administer medications as ordered and needed, Encourage pt to attend and participate in groups, encourage pt to be up for all meals and snacks, consuming all each time, encourage pt to interact with peers in a positive manner. Q 15 minute safety checks continue. R: Compliant with medications. does attend groups, does participate. Pt. is getting up for meals, consumes all of meals, snacks. Pt. does interact with peers. no safety concerns at this time. P: Pt. Will continue to utilize positive coping skills.

## 2021-07-09 NOTE — BH NOTES
Behavioral Health Treatment Team Note     Patient goal(s) for today: Come to groups, stay positive  Treatment team focus/goals: Engage in treatment, monitor medications    Progress note: Pt TDO allowed voluntary status. Pt is AO x 4, denies AVH, denies SI/HI, cooperative. Pt denies anxiety and depression. Pt has a broad affect and pleasant presentation. Pt is attending groups, socializing with peers, and communicating with staff effectively. Pt identifies exercise, breathing exercises, and 5-4-3-2-1 technique as coping alternatives. He verbalizes that he will follow-up with outpatient. Will continue to monitor medications and plan for safe discharge.     LOS:  6  Expected LOS: 7    Insurance info/prescription coverage:  TDO/Uninsured  Date of last family contact:  None  Family requesting physician contact today:  no  Discharge plan:  Discharge home and follow-up with Lexi Sharpe in the home:  no   Outpatient provider(s):  [employer]

## 2021-07-09 NOTE — BH NOTES
Behavioral Health Transition Record to Provider    Patient Name: Dutch Regan  YOB: 2000  Medical Record Number: [id number]  Date of Admission: 7/2/2021  Date of Discharge: 7/9/2021    Attending Provider: Nickie Miller Provider: Feliz Lares  To contact this individual call [phone] and ask the  to page. If unavailable, ask to be transferred to [facility] Provider on call. [facility] Provider will be available on call 24/7 and during holidays. Primary Care Provider: Unknown, Provider, MD    No Known Allergies    Reason for Admission:Pt is TDO allowed to be voluntary. Pt reports he has been depressed due to his situation recently. Pt reports he has legal problems and is facing a short stay in prison due to missing meetings with his . Prescreen notes pt brought in on paperless [employer] after family  Called concerned with mental status. Reports client ran into woods with a box knife and called his family to tell them that he loved them. Pt was sobbing when WILFRIDO arrived and had a . Pt was cooperative with WILFRIDO, but during assessment was not cooperative due to unstable mood and sobbing. Pt refused to talk with anyone for assessment and stated, \"I should've gone through  With it. \"  And \"I am tired of life. \"  \"I just want to be in the ground next to my father. \"      Admission Diagnosis: MDD (major depressive disorder), single episode [F32.9]  Suicidal ideations [R45.851]    * No surgery found *    Results for orders placed or performed during the hospital encounter of 07/02/21   HEMOGLOBIN A1C WITH EAG   Result Value Ref Range    Hemoglobin A1c 5.4 4.0 - 5.6 %    Est. average glucose 108 mg/dL   LIPID PANEL   Result Value Ref Range    LIPID PROFILE        Cholesterol, total 144 <200 mg/dL    Triglyceride 53 <150 mg/dL    HDL Cholesterol 59 mg/dL    LDL, calculated 74.4 0 - 100 mg/dL    VLDL, calculated 10.6 mg/dL    CHOL/HDL Ratio 2.4 0.0 - 5.0 LIPID PANEL   Result Value Ref Range    LIPID PROFILE        Cholesterol, total 137 <200 mg/dL    Triglyceride 63 <150 mg/dL    HDL Cholesterol 53 mg/dL    LDL, calculated 71.4 0 - 100 mg/dL    VLDL, calculated 12.6 mg/dL    CHOL/HDL Ratio 2.6 0.0 - 5.0         Immunizations administered during this encounter: There is no immunization history on file for this patient. Screening for Metabolic Disorders for Patients on Antipsychotic Medications  (Data obtained from the EMR)    Estimated Body Mass Index  Estimated body mass index is 19.66 kg/m² as calculated from the following:    Height as of this encounter: 6' 1\" (1.854 m). Weight as of this encounter: 67.6 kg (149 lb). Vital Signs/Blood Pressure  Visit Vitals  /82   Pulse 67   Temp 98.4 °F (36.9 °C)   Resp 16   Ht 6' 1\" (1.854 m)   Wt 67.6 kg (149 lb)   SpO2 98%   BMI 19.66 kg/m²       Blood Glucose/Hemoglobin A1c  No results found for: GLU, GLUCPOC    Lab Results   Component Value Date/Time    Hemoglobin A1c 5.4 07/02/2021 07:20 AM        Lipid Panel  Lab Results   Component Value Date/Time    Cholesterol, total 137 07/03/2021 07:34 AM    HDL Cholesterol 53 07/03/2021 07:34 AM    LDL, calculated 71.4 07/03/2021 07:34 AM    Triglyceride 63 07/03/2021 07:34 AM    CHOL/HDL Ratio 2.6 07/03/2021 07:34 AM        Discharge Diagnosis: Major Depressive Disorder    Discharge Plan: Discharge home, follow-up with [facility]. Discharge Medication List and Instructions:   Discharge Medication List as of 7/9/2021 11:14 AM      START taking these medications    Details   citalopram (CELEXA) 20 mg tablet Take 1 Tablet by mouth every morning for 30 days. , Normal, Disp-30 Tablet, R-1             Unresulted Labs (24h ago, onward)    None        To obtain results of studies pending at discharge, please contact [phone].     Follow-up Information     Follow up With Specialties Details Why [address] [facility] on 7/12/2021 1:00PM follow-up appointment with Chary Nova. [employer], [address] [facility]  Phone: [phone]  Fax: [phone]    Unknown, Provider, MD    Patient not available to ask            Advanced Directive:   Does the patient have an appointed surrogate decision maker? No  Does the patient have a Medical Advance Directive? No  Does the patient have a Psychiatric Advance Directive? No  If the patient does not have a surrogate or Medical Advance Directive AND Psychiatric Advance Directive, the patient was offered information on these advance directives Patient declined to complete and Patient will complete at a later time    Patient Instructions: Please continue all medications until otherwise directed by physician. Tobacco Cessation Discharge Plan:   Is the patient a smoker and needs referral for smoking cessation? Yes  Patient referred to the following for smoking cessation with an appointment? Refused     Patient was offered medication to assist with smoking cessation at discharge? Refused  Was education for smoking cessation added to the discharge instructions? Yes    Alcohol/Substance Abuse Discharge Plan:   Does the patient have a history of substance/alcohol abuse and requires a referral for treatment? No  Patient referred to the following for substance/alcohol abuse treatment with an appointment? No  Patient was offered medication to assist with alcohol cessation at discharge? Not applicable  Was education for substance/alcohol abuse added to discharge instructions?  Not applicable    Patient discharged to Home; discussed with patient/caregiver

## 2021-07-09 NOTE — SUICIDE SAFETY PLAN
SAFETY PLAN    A suicide Safety Plan is a document that supports someone when they are having thoughts of suicide. Warning Signs that indicate a suicidal crisis may be developing: What (situations, thoughts, feelings, body sensations, behaviors, etc.) do you experience that lets you know you are beginning to think about suicide? 1. Depression  2. Sobbing  3. Agitation    Internal Coping Strategies:  What things can I do (relaxation techniques, hobbies, physical activities, etc.) to take my mind off my problems without contacting another person? 1. Music  2. Five-Finger Breathing  3. 5-4-3-2-1 Technique/coping skills sheets    People and social settings that provide distraction: Who can I call or where can I go to distract me? 1. Name: Mom  Phone: In Cell Phone  2. Name: Gabriel Wolf Phone: In Cell Phone  3. Place: My Girlfriend's    People whom I can ask for help: Who can I call when I need help - for example, friends, family, clergy, someone else? 1. Name: Mom  Phone: In Cell Phone  2. Name: Gabriel Wolf Phone: In Cell Phone  3. Name: Girlfriend Phone: In Cell Phone    Professionals or [address] [place] [address] I can contact during a crisis: Who can I call for help - for example, my doctor, my psychiatrist, my psychologist, a mental health provider, a suicide hotline? 1. Clinician Name: [address] [place] [address]   Phone: [phone]      Clinician Pager or Emergency Contact #: [phone]    2. Clinician Name: [facility]   Phone: [phone]  [facility] is primary care for a low-cost.          3. Suicide Prevention Lifeline: 4-384-204-TALK (0056)    4. [address] [place] [address] Emergency Services -  for example, [facility] suicide hotline, [facility] Hotline: [address] [place] [address]            Emergency Services Phone: #: [phone]    Making the environment safe:  How can I make my environment (house/apartment/living space) safer? For example, can I remove guns, medications, and other items? 1. Do not access box cutters or knifes/store away  2.  Call someone on your contact list

## 2021-10-15 NOTE — DISCHARGE SUMMARY
Discharge summary    Hospital course  Anna Mendoza is a 78-year-old male admitted to psychiatric unit July 2 and discharged July 9, 2021. He was mostly under  and also Dr. Earnest Antunez care. He presents to the hospital with depressive symptoms. He was found in the woods with a  crying threatening to kill himself wanting to die. He was subsequently admitted on a temporary detaining order. He presented with depressive symptoms denied psychotic symptoms. Due to the significant depressive symptoms we started him on citalopram that is Celexa 20 mg daily. He tolerated this medication well. He also benefit from inpatient treatments including psychosocial interventions. He was initially sad crying depressed but as hospitalization continued his depressive symptoms decreased. He was able to participate in the groups. He was eating and sleeping better. His outlook improved and he was able to communicate more openly was less depressed. He also spoke with his family and participated in discharge planning and requested discharge. As hospitalization continued we noted that he was doing better and maintaining safe behaviors. On July 9 we decided that the goals of acute hospital stay was met and he was discharged that day.   He placed for safety outside the hospital and denied psychotic symptoms    Discharge mental status exam  Alert oriented cooperative  Speech is goal-directed spontaneous  Mood is okay affect is restricted  Thought process linear and logical  Does not appear to respond to internal stimuli  Insight and judgment fair  No suicidality  No evidence of acute psychosis    Discharge diagnosis  Major depressive disorder    Discharge recommendations  Continue current medication including Celexa 20 mg daily  Referrals and appointments were made to the outpatient setting  He pledges for safety outside the hospital    Discharge condition  Good, we wish him well